# Patient Record
Sex: MALE | Race: OTHER | Employment: UNEMPLOYED | URBAN - METROPOLITAN AREA
[De-identification: names, ages, dates, MRNs, and addresses within clinical notes are randomized per-mention and may not be internally consistent; named-entity substitution may affect disease eponyms.]

---

## 2022-11-04 ENCOUNTER — HOSPITAL ENCOUNTER (EMERGENCY)
Age: 22
Discharge: HOME | End: 2022-11-04
Attending: EMERGENCY MEDICINE
Payer: MEDICARE

## 2022-11-04 ENCOUNTER — APPOINTMENT (OUTPATIENT)
Dept: GENERAL RADIOLOGY | Age: 22
End: 2022-11-04
Payer: MEDICARE

## 2022-11-04 VITALS
OXYGEN SATURATION: 99 % | WEIGHT: 175 LBS | RESPIRATION RATE: 16 BRPM | DIASTOLIC BLOOD PRESSURE: 71 MMHG | BODY MASS INDEX: 22.46 KG/M2 | HEART RATE: 74 BPM | TEMPERATURE: 97 F | HEIGHT: 74 IN | SYSTOLIC BLOOD PRESSURE: 113 MMHG

## 2022-11-04 DIAGNOSIS — R07.89 ATYPICAL CHEST PAIN: Primary | ICD-10-CM

## 2022-11-04 LAB
D-DIMER QUANTITATIVE: 0.22 MG/L FEU
TROPONIN, HIGH SENSITIVITY: <6 NG/L (ref 0–22)

## 2022-11-04 PROCEDURE — 84484 ASSAY OF TROPONIN QUANT: CPT

## 2022-11-04 PROCEDURE — 99285 EMERGENCY DEPT VISIT HI MDM: CPT

## 2022-11-04 PROCEDURE — 6370000000 HC RX 637 (ALT 250 FOR IP): Performed by: STUDENT IN AN ORGANIZED HEALTH CARE EDUCATION/TRAINING PROGRAM

## 2022-11-04 PROCEDURE — 71046 X-RAY EXAM CHEST 2 VIEWS: CPT

## 2022-11-04 PROCEDURE — 93005 ELECTROCARDIOGRAM TRACING: CPT | Performed by: STUDENT IN AN ORGANIZED HEALTH CARE EDUCATION/TRAINING PROGRAM

## 2022-11-04 PROCEDURE — 85379 FIBRIN DEGRADATION QUANT: CPT

## 2022-11-04 RX ORDER — ACETAMINOPHEN 500 MG
1000 TABLET ORAL ONCE
Status: COMPLETED | OUTPATIENT
Start: 2022-11-04 | End: 2022-11-04

## 2022-11-04 RX ORDER — IBUPROFEN 800 MG/1
800 TABLET ORAL ONCE
Status: COMPLETED | OUTPATIENT
Start: 2022-11-04 | End: 2022-11-04

## 2022-11-04 RX ORDER — IBUPROFEN 400 MG/1
400 TABLET ORAL EVERY 6 HOURS PRN
Qty: 30 TABLET | Refills: 0 | Status: SHIPPED | OUTPATIENT
Start: 2022-11-04

## 2022-11-04 RX ADMIN — IBUPROFEN 800 MG: 800 TABLET, FILM COATED ORAL at 12:37

## 2022-11-04 RX ADMIN — ACETAMINOPHEN 1000 MG: 500 TABLET ORAL at 12:37

## 2022-11-04 ASSESSMENT — HEART SCORE
ECG: 1
ECG: 1

## 2022-11-04 ASSESSMENT — PAIN SCALES - GENERAL
PAINLEVEL_OUTOF10: 6
PAINLEVEL_OUTOF10: 4

## 2022-11-04 ASSESSMENT — PAIN - FUNCTIONAL ASSESSMENT: PAIN_FUNCTIONAL_ASSESSMENT: 0-10

## 2022-11-04 ASSESSMENT — LIFESTYLE VARIABLES
HOW MANY STANDARD DRINKS CONTAINING ALCOHOL DO YOU HAVE ON A TYPICAL DAY: PATIENT DOES NOT DRINK
HOW OFTEN DO YOU HAVE A DRINK CONTAINING ALCOHOL: NEVER

## 2022-11-04 NOTE — ED PROVIDER NOTES
[facility]  ED  Emergency Department Encounter  Emergency Medicine Resident     Pt Name: Donna Coe  MRN: [id number]  [place] 2000  Date of evaluation: 11/4/22  PCP:  Zac Collazo MD    [address]       Chief Complaint   Patient presents with    Chest Pain     Right sided chest pain when taking a deep breath in       HISTORY OFPRESENT ILLNESS  (Location/Symptom, Timing/Onset, Context/Setting, Quality, Duration, Modifying Holger .)      Donna Coe is a 24 y.o. male who presents with right-sided chest pain with inspiration that began last night while watching a movie. Nonexertional, constant pain but worse with inhalation. Nonradiating. No numbness/tingling/weakness of the arms or legs. No nausea or vomiting associated. No shortness of breath. Denies cough, nasal congestion, fevers, chills. No leg swelling. He has a family history significant for acute coronary syndrome at a young age, his mother underwent CABG in her 45s. No personal history of clotting disorder or history of DVT/PEs, however patient believes his mother may have a clotting disorder. He uses vapes but no cigarettes. No recent trauma or long car rides/periods of immobilization. PAST MEDICAL / SURGICAL / SOCIAL / FAMILY HISTORY      has a past medical history of Asthma. has no past surgical history on file. Social:  reports that he has never smoked. He uses smokeless tobacco. He reports that he does not currently use alcohol. He reports current drug use. Drug: Marijuana Khalif Gomez). Family Hx: History reviewed. No pertinent family history. Allergies:  Nuts [peanut-containing drug products] and Seasonal    Home Medications:  Prior to Admission medications    Medication Sig Start Date End Date Taking?  Authorizing Provider   ibuprofen (IBU) 400 MG tablet Take 1 tablet by mouth every 6 hours as needed for Pain 11/4/22  Yes Darline Lozano MD       REVIEW OFSYSTEMS    (2-9 systems for level 4, 10 or more for level 5)      Review of Systems   Constitutional:  Negative for appetite change, chills, fatigue and fever. HENT:  Negative for congestion, rhinorrhea, sneezing and sore throat. Eyes:  Negative for visual disturbance. Respiratory:  Negative for cough and shortness of breath. Cardiovascular:  Positive for chest pain. Negative for leg swelling. Gastrointestinal:  Negative for abdominal pain, diarrhea, nausea and vomiting. Genitourinary:  Negative for dysuria. Musculoskeletal:  Negative for myalgias, neck pain and neck stiffness. Skin:  Negative for rash and wound. Neurological:  Negative for dizziness, syncope, light-headedness and headaches. Psychiatric/Behavioral:  Negative for dysphoric mood and suicidal ideas. PHYSICAL EXAM   (up to 7 for level 4, 8 or more forlevel 5)      INITIAL VITALS:   Vitals:    11/04/22 1218   BP: 113/71   Pulse: 74   Resp: 16   Temp:    SpO2: 99%    /71   Pulse 74   Temp 97 °F (36.1 °C) (Oral)   Resp 16   Ht 6' 2\" (1.88 m)   Wt 175 lb (79.4 kg)   SpO2 99%   BMI 22.47 kg/m²       Physical Exam  Vitals and nursing note reviewed. Constitutional:       General: He is not in acute distress. Appearance: Normal appearance. He is normal weight. He is not ill-appearing, toxic-appearing or diaphoretic. HENT:      Nose: Nose normal.      Mouth/Throat:      Mouth: Mucous membranes are moist.      Pharynx: Oropharynx is clear. Eyes:      Extraocular Movements: Extraocular movements intact. Conjunctiva/sclera: Conjunctivae normal.      Pupils: Pupils are equal, round, and reactive to light. Cardiovascular:      Rate and Rhythm: Normal rate and regular rhythm. Pulses: Normal pulses. Heart sounds: Normal heart sounds. Pulmonary:      Effort: Pulmonary effort is normal.      Breath sounds: Normal breath sounds. Abdominal:      General: There is no distension. Palpations: Abdomen is soft. Tenderness:  There is no abdominal tenderness. There is no right CVA tenderness, left CVA tenderness or guarding. Musculoskeletal:         General: Normal range of motion. Cervical back: Normal range of motion and neck supple. Skin:     General: Skin is warm. Capillary Refill: Capillary refill takes less than 2 seconds. Neurological:      General: No focal deficit present. Mental Status: He is alert and oriented to person, place, and time. Psychiatric:         Mood and Affect: Mood normal.         Behavior: Behavior normal.       MEDICAL DECISION MAKING     Initial MDM/Plan: 24 y.o. male who presents with    Nonexertional right-sided chest pain worse with deep inspiration, no concerning associated symptoms. Positive family history of coronary artery disease in his mother at a young age. No PE/DVT risk factors, although he does feel that his mother may have a clotting disorder but he is not sure. Vital signs reviewed and are within normal limits. Physical examination as above. Plan to obtain an EKG, chest x-ray. If any findings concerning on EKG or chest x-ray, will continue with troponin and D-dimer. Considering pneumonia, ACS, pulmonary embolus, pneumothorax in the differential.       Heart Score for chest pain patients  History:  Moderately Suspicious  ECG: Non-Specifc repolarization disturbance/LBTB/PM  Patient Age: < 39 years  *Risk factors for Atherosclerotic disease: Positive family History  Risk Factors: 1 or 2 risk factors  Troponin: < 1X normal limit  Heart Score Total: 3  [place] Coma Scale  Eye Opening: Spontaneous  Best Verbal Response: Oriented  Best Motor Response: Obeys commands  [place] Coma Scale Score: 15    DIAGNOSTIC RESULTS / EMERGENCYDEPARTMENT COURSE / MDM     LABS:  Labs Reviewed   D-DIMER, QUANTITATIVE   TROPONIN         RADIOLOGY:  XR CHEST (2 VW)   Final Result   Negative PA and lateral chest.              EKG  EKG Interpretation    Interpreted by emergency department physician    Rhythm: normal sinus   Rate: normal  Axis: normal  Ectopy: none  Conduction: normal  ST Segments: normal  T Waves: inversion in  III and aVf  Q Waves: none    Clinical Impression: non-specific EKG    Annetta Bonilla MD      All EKG's are interpreted by the Emergency Department Physicianwho either signs or Co-signs this chart in the absence of a cardiologist.    EMERGENCY DEPARTMENT COURSE:  ED Course as of 11/08/22 1408   Fri Nov 04, 2022   1129 Troponin, High Sensitivity: <6 [JT]   1157 D-Dimer, Quant: 0.22 [JT]      ED Course User Index  [JT] Annetta Bonilla MD      T wave inversions in leads III and aVF with no prior EKG to compare. Therefore, will obtain a troponin as well. Chest x-ray unremarkable. Troponin undetectable, D-dimer negative. Discussed with patient admitting to observation unit for further cardiac evaluation or discharging home and following up with a primary care physician. Patient would prefer to go home and follow-up on its own. Patient was provided with family medicine clinic phone number to establish follow-up. Patient medically stable for discharge, heart score 3 low risk. PROCEDURES:  None    CONSULTS:  None      FINAL IMPRESSION      1. Atypical chest pain          DISPOSITION / PLAN     DISPOSITION Decision To Discharge 11/04/2022 12:18:03 PM      PATIENT REFERRED TO:  [facility]  [address] [facility] [id number] [phone]  Go to   If symptoms worsen    BRENDA Ramirez CNP [address] [facility]  [phone]    Schedule an appointment as soon as possible for a visit       DISCHARGE MEDICATIONS:  There are no discharge medications for this patient.       Annetta Bonilla MD  Emergency Medicine Resident    (Please note that portions of this note were completed with a voice recognition program.Efforts were made to edit the dictations but occasionally words are mis-transcribed.)        Annetta Bonilla MD  Resident  11/08/22 1618

## 2022-11-04 NOTE — ED PROVIDER NOTES
[address] [facility]     Emergency Department     Faculty Attestation    I performed a history and physical examination of the patient and discussed management with the resident. I reviewed the residents note and agree with the documented findings and plan of care. Any areas of disagreement are noted on the chart. I was personally present for the key portions of any procedures. I have documented in the chart those procedures where I was not present during the key portions. I have reviewed the emergency nurses triage note. I agree with the chief complaint, past medical history, past surgical history, allergies, medications, social and family history as documented unless otherwise noted below. For Physician Assistant/ Nurse Practitioner cases/documentation I have personally evaluated this patient and have completed at least one if not all key elements of the E/M (history, physical exam, and MDM). Additional findings are as noted. Patient with right-sided chest pain began last night. No injury no trauma. No other associated symptoms with it no shortness of breath no nausea no vomiting no palpitations lightheadedness or syncope. Denies history of cardiac disease no PE risk factors. On exam well-appearing nontoxic. Lungs clear and equal bilaterally. Chest right anterior tenderness no crepitus no ecchymosis. Equal radial pedal pulses bilaterally. Abdomen soft nontender. Will check chest x-ray EKG, reevaluate need for additional work-up    EKG interpretation: Sinus rhythm 81. Normal intervals. Normal axis. No acute ST changes, there is T wave inversion in III and aVF. No old EKGs for comparison. Abnormal EKG    Critical Care     none    Rosette Segal MD, Delicia Myers  Attending Emergency  Physician           Rosette Segal MD  11/04/22 1142    Repeat EKG, 1208:  Sinus rhythm 77 normal intervals normal axis no acute ST changes, T wave inversion seen on the initial EKG has resolved in aVF minimal nonspecific in III.     Heart Score:   Heart Score for chest pain patients  History: Slightly Suspicious  ECG: Non-Specifc repolarization disturbance/LBTB/PM  Patient Age: < 45 years  *Risk factors for Atherosclerotic disease: Positive family History  Risk Factors: 1 or 2 risk factors  Troponin: < 1X normal limit  Heart Score Total: 2    Score 0-3: 2.5% MACE over next 6 weeks = Discharge Home  Score 4-6: 20.3% MACE over next 6 weeks = Admit for Clinical Observation  Score 7-10: 72.7% MACE over next 6 weeks = Early Invasive Strategies            Malu Hobbs MD  11/04/22 5696

## 2022-11-04 NOTE — Clinical Note
Janell Martinez was seen and treated in our emergency department on 11/4/2022. He may return to work on 11/05/2022. If you have any questions or concerns, please don't hesitate to call.       Diane Gant MD

## 2022-11-04 NOTE — ED TRIAGE NOTES
Pt presents to the ER with mom. PT states he has been noting pain in right chest when taking a deep breath in. Pt denies shortness of breath and cough. Pt does have a history of asthma, but has it under control. Pt denies any blunt trauma or straining to lift something the last week. Pain began yesterday afternoon and continued today.

## 2022-11-04 NOTE — ED NOTES
The following labs were labeled with patient stickers & tubed to lab;    [x]Lavender   []On Ice  [x]Blue  [x]Green/ Yellow  []Green/ Black []On Ice  []Pink  [x]Red  []Yellow    []COVID-19 Swab []Rapid    []Urine Sample  []Pelvic Cultures    []Blood Cultures       Ulises Mccurdy LPN  [birth year] [id number] negative

## 2022-11-05 LAB
EKG ATRIAL RATE: 77 BPM
EKG ATRIAL RATE: 81 BPM
EKG P AXIS: 77 DEGREES
EKG P AXIS: 79 DEGREES
EKG P-R INTERVAL: 172 MS
EKG P-R INTERVAL: 178 MS
EKG Q-T INTERVAL: 336 MS
EKG Q-T INTERVAL: 364 MS
EKG QRS DURATION: 72 MS
EKG QRS DURATION: 72 MS
EKG QTC CALCULATION (BAZETT): 390 MS
EKG QTC CALCULATION (BAZETT): 411 MS
EKG R AXIS: 70 DEGREES
EKG R AXIS: 73 DEGREES
EKG T AXIS: -6 DEGREES
EKG T AXIS: 17 DEGREES
EKG VENTRICULAR RATE: 77 BPM
EKG VENTRICULAR RATE: 81 BPM

## 2022-11-05 PROCEDURE — 93010 ELECTROCARDIOGRAM REPORT: CPT | Performed by: INTERNAL MEDICINE

## 2022-11-08 ASSESSMENT — ENCOUNTER SYMPTOMS
DIARRHEA: 0
NAUSEA: 0
COUGH: 0
VOMITING: 0
RHINORRHEA: 0
SORE THROAT: 0
ABDOMINAL PAIN: 0
SHORTNESS OF BREATH: 0

## 2025-03-18 NOTE — DISCHARGE INSTRUCTIONS
2nd attempt  Left message for  below to obtain verbal authorization for [facility]     Ruby Noel  [phone]  Claim: [id number]    You were seen in the ER due to chest pain. While you were here we did an in-depth workup including 2 EKGs, a chest x-ray, and bloodwork. Your EKG showed some abnormal findings, and while they were not concerning enough to keep you in the hospital, we do recommend you follow-up with your primary care physician. Come back to the ER if you have worsening chest pain, shortness of breath, lightheadedness. Martina Juarez!!!    From [facility] Emergency Department    On behalf of the Emergency Department staff at [facility]. [facility] Emergency Department, I would like to thank you for giving [facility] the opportunity to address your health care needs and concerns. We hope that during your visit, our service was delivered in a professional and caring manner. Please keep [facility] in mind as we walk with you down the path to your own personal wellness. Please expect an automated phone call from [phone] so we can ask a few questions about your health and progress. Based on your answers, a clinician may call you back to offer help and instructions. If you notice any concerning symptoms please return to the ER immediately. These can include but are not limited to: fevers, chills, shortness of breath, vomiting, weakness of the extremities, changes in your mental status, numbness, pale extremities, or chest pain.